# Patient Record
Sex: FEMALE | Race: WHITE | NOT HISPANIC OR LATINO | URBAN - METROPOLITAN AREA
[De-identification: names, ages, dates, MRNs, and addresses within clinical notes are randomized per-mention and may not be internally consistent; named-entity substitution may affect disease eponyms.]

---

## 2021-05-26 ENCOUNTER — OFFICE (OUTPATIENT)
Dept: URBAN - METROPOLITAN AREA CLINIC 67 | Facility: CLINIC | Age: 46
End: 2021-05-26

## 2021-05-26 VITALS — HEIGHT: 62 IN | TEMPERATURE: 97.3 F | OXYGEN SATURATION: 98 % | WEIGHT: 226 LBS | HEART RATE: 98 BPM

## 2021-05-26 DIAGNOSIS — K51.019 ULCERATIVE (CHRONIC) PANCOLITIS WITH UNSPECIFIED COMPLICATIO: ICD-10-CM

## 2021-05-26 DIAGNOSIS — R19.7 DIARRHEA, UNSPECIFIED: ICD-10-CM

## 2021-05-26 DIAGNOSIS — K62.5 HEMORRHAGE OF ANUS AND RECTUM: ICD-10-CM

## 2021-05-26 DIAGNOSIS — R10.9 UNSPECIFIED ABDOMINAL PAIN: ICD-10-CM

## 2021-05-26 PROCEDURE — 99214 OFFICE O/P EST MOD 30 MIN: CPT

## 2021-05-26 RX ORDER — MESALAMINE 375 MG/1
1.5 CAPSULE, EXTENDED RELEASE ORAL
Qty: 120 | Refills: 1 | Status: ACTIVE

## 2021-05-26 NOTE — SERVICEHPINOTES
Ayla Szymanski   is seen today for a follow-up visit.     She returns today for Apriso refill and stating she has been in a flare x 3 weeks. She complains of diarrhea, up to 10 times per day, with nocturnal stooling, bright red blood per rectum, and lower abdominal cramping pain. She states she was given a Medrol dose pack by her PCP for a sinus infection the first part of April and a script for Augmentin for 10 days that she finished on Monday. She states "this flare is different than the others."

## 2021-05-27 LAB
C-REACTIVE PROTEIN, QUANT: 13 MG/L — HIGH (ref 0–10)
CBC WITH DIFFERENTIAL/PLATELET: BASO (ABSOLUTE): 0.2 X10E3/UL (ref 0–0.2)
CBC WITH DIFFERENTIAL/PLATELET: BASOS: 1 %
CBC WITH DIFFERENTIAL/PLATELET: EOS (ABSOLUTE): 0.9 X10E3/UL — HIGH (ref 0–0.4)
CBC WITH DIFFERENTIAL/PLATELET: EOS: 5 %
CBC WITH DIFFERENTIAL/PLATELET: HEMATOCRIT: 39.1 % (ref 34–46.6)
CBC WITH DIFFERENTIAL/PLATELET: HEMATOLOGY COMMENTS: (no result)
CBC WITH DIFFERENTIAL/PLATELET: HEMOGLOBIN: 13 G/DL (ref 11.1–15.9)
CBC WITH DIFFERENTIAL/PLATELET: IMMATURE CELLS: (no result)
CBC WITH DIFFERENTIAL/PLATELET: IMMATURE GRANS (ABS): 0 X10E3/UL (ref 0–0.1)
CBC WITH DIFFERENTIAL/PLATELET: IMMATURE GRANULOCYTES: 0 %
CBC WITH DIFFERENTIAL/PLATELET: LYMPHS (ABSOLUTE): 2.9 X10E3/UL (ref 0.7–3.1)
CBC WITH DIFFERENTIAL/PLATELET: LYMPHS: 16 %
CBC WITH DIFFERENTIAL/PLATELET: MCH: 30 PG (ref 26.6–33)
CBC WITH DIFFERENTIAL/PLATELET: MCHC: 33.2 G/DL (ref 31.5–35.7)
CBC WITH DIFFERENTIAL/PLATELET: MCV: 90 FL (ref 79–97)
CBC WITH DIFFERENTIAL/PLATELET: MONOCYTES(ABSOLUTE): 1.1 X10E3/UL — HIGH (ref 0.1–0.9)
CBC WITH DIFFERENTIAL/PLATELET: MONOCYTES: 6 %
CBC WITH DIFFERENTIAL/PLATELET: NEUTROPHILS (ABSOLUTE): 13.2 X10E3/UL — HIGH (ref 1.4–7)
CBC WITH DIFFERENTIAL/PLATELET: NEUTROPHILS: 72 %
CBC WITH DIFFERENTIAL/PLATELET: NRBC: (no result)
CBC WITH DIFFERENTIAL/PLATELET: PLATELETS: 466 X10E3/UL — HIGH (ref 150–450)
CBC WITH DIFFERENTIAL/PLATELET: RBC: 4.34 X10E6/UL (ref 3.77–5.28)
CBC WITH DIFFERENTIAL/PLATELET: RDW: 13.2 % (ref 11.7–15.4)
CBC WITH DIFFERENTIAL/PLATELET: WBC: 18.2 X10E3/UL — HIGH (ref 3.4–10.8)
COMP. METABOLIC PANEL (14): A/G RATIO: 1.3 (ref 1.2–2.2)
COMP. METABOLIC PANEL (14): ALBUMIN: 3.9 G/DL (ref 3.8–4.8)
COMP. METABOLIC PANEL (14): ALKALINE PHOSPHATASE: 94 IU/L (ref 48–121)
COMP. METABOLIC PANEL (14): ALT (SGPT): 10 IU/L (ref 0–32)
COMP. METABOLIC PANEL (14): AST (SGOT): 11 IU/L (ref 0–40)
COMP. METABOLIC PANEL (14): BILIRUBIN, TOTAL: <0.2 MG/DL
COMP. METABOLIC PANEL (14): BUN/CREATININE RATIO: 9 (ref 9–23)
COMP. METABOLIC PANEL (14): BUN: 7 MG/DL (ref 6–24)
COMP. METABOLIC PANEL (14): CALCIUM: 8.7 MG/DL (ref 8.7–10.2)
COMP. METABOLIC PANEL (14): CARBON DIOXIDE, TOTAL: 18 MMOL/L — LOW (ref 20–29)
COMP. METABOLIC PANEL (14): CHLORIDE: 106 MMOL/L (ref 96–106)
COMP. METABOLIC PANEL (14): CREATININE: 0.74 MG/DL (ref 0.57–1)
COMP. METABOLIC PANEL (14): EGFR IF AFRICN AM: 113 ML/MIN/1.73 (ref 59–?)
COMP. METABOLIC PANEL (14): EGFR IF NONAFRICN AM: 98 ML/MIN/1.73 (ref 59–?)
COMP. METABOLIC PANEL (14): GLOBULIN, TOTAL: 3.1 G/DL (ref 1.5–4.5)
COMP. METABOLIC PANEL (14): GLUCOSE: 87 MG/DL (ref 65–99)
COMP. METABOLIC PANEL (14): POTASSIUM: 4.1 MMOL/L (ref 3.5–5.2)
COMP. METABOLIC PANEL (14): PROTEIN, TOTAL: 7 G/DL (ref 6–8.5)
COMP. METABOLIC PANEL (14): SODIUM: 139 MMOL/L (ref 134–144)

## 2021-05-29 LAB
GI PROFILE, STOOL, PCR: ADENOVIRUS F 40/41: NOT DETECTED
GI PROFILE, STOOL, PCR: ASTROVIRUS: NOT DETECTED
GI PROFILE, STOOL, PCR: C DIFFICILE TOXIN A/B: NOT DETECTED
GI PROFILE, STOOL, PCR: CAMPYLOBACTER: NOT DETECTED
GI PROFILE, STOOL, PCR: CRYPTOSPORIDIUM: NOT DETECTED
GI PROFILE, STOOL, PCR: CYCLOSPORA CAYETANENSIS: NOT DETECTED
GI PROFILE, STOOL, PCR: E COLI O157: (no result)
GI PROFILE, STOOL, PCR: ENTAMOEBA HISTOLYTICA: NOT DETECTED
GI PROFILE, STOOL, PCR: ENTEROAGGREGATIVE E COLI: NOT DETECTED
GI PROFILE, STOOL, PCR: ENTEROPATHOGENIC E COLI: NOT DETECTED
GI PROFILE, STOOL, PCR: ENTEROTOXIGENIC E COLI: NOT DETECTED
GI PROFILE, STOOL, PCR: GIARDIA LAMBLIA: NOT DETECTED
GI PROFILE, STOOL, PCR: NOROVIRUS GI/GII: NOT DETECTED
GI PROFILE, STOOL, PCR: PLESIOMONAS SHIGELLOIDES: NOT DETECTED
GI PROFILE, STOOL, PCR: ROTAVIRUS A: NOT DETECTED
GI PROFILE, STOOL, PCR: SALMONELLA: NOT DETECTED
GI PROFILE, STOOL, PCR: SAPOVIRUS: NOT DETECTED
GI PROFILE, STOOL, PCR: SHIGA-TOXIN-PRODUCING E COLI: NOT DETECTED
GI PROFILE, STOOL, PCR: SHIGELLA/ENTEROINVASIVE E COLI: NOT DETECTED
GI PROFILE, STOOL, PCR: VIBRIO CHOLERAE: NOT DETECTED
GI PROFILE, STOOL, PCR: VIBRIO: NOT DETECTED
GI PROFILE, STOOL, PCR: YERSINIA ENTEROCOLITICA: NOT DETECTED

## 2021-06-02 LAB — CALPROTECTIN, FECAL: 627 UG/G — HIGH (ref 0–120)

## 2022-02-04 ENCOUNTER — RX ONLY (RX ONLY)
Age: 47
End: 2022-02-04

## 2022-02-04 ENCOUNTER — OTHER (OUTPATIENT)
Dept: URBAN - METROPOLITAN AREA CLINIC 18 | Facility: CLINIC | Age: 47
Setting detail: DERMATOLOGY
End: 2022-02-04

## 2022-02-04 PROCEDURE — 99203 OFFICE O/P NEW LOW 30 MIN: CPT

## 2022-02-04 RX ORDER — TRIAMCINOLONE ACETONIDE 1 MG/G
A SMALL AMOUNT CREAM TOPICAL TWICE A DAY
Qty: 454 | Refills: 1
Start: 2022-02-04

## 2022-02-18 ENCOUNTER — RX ONLY (RX ONLY)
Age: 47
End: 2022-02-18

## 2022-02-18 ENCOUNTER — FOLLOW-UP (OUTPATIENT)
Dept: URBAN - METROPOLITAN AREA CLINIC 18 | Facility: CLINIC | Age: 47
Setting detail: DERMATOLOGY
End: 2022-02-18

## 2022-02-18 DIAGNOSIS — D48.5 NEOPLASM OF UNCERTAIN BEHAVIOR OF SKIN: ICD-10-CM

## 2022-02-18 PROCEDURE — 99214 OFFICE O/P EST MOD 30 MIN: CPT

## 2022-02-18 RX ORDER — PREDNISONE 20 MG/1
TABLET ORAL
Qty: 20 | Refills: 0
Start: 2022-02-18

## 2022-05-17 ENCOUNTER — OFFICE (OUTPATIENT)
Dept: URBAN - METROPOLITAN AREA CLINIC 84 | Facility: CLINIC | Age: 47
End: 2022-05-17

## 2022-05-17 VITALS
WEIGHT: 226 LBS | HEIGHT: 62 IN | SYSTOLIC BLOOD PRESSURE: 142 MMHG | OXYGEN SATURATION: 97 % | HEART RATE: 77 BPM | DIASTOLIC BLOOD PRESSURE: 76 MMHG

## 2022-05-17 PROCEDURE — 99214 OFFICE O/P EST MOD 30 MIN: CPT | Performed by: SPECIALIST

## 2022-05-17 RX ORDER — MESALAMINE 375 MG/1
1.5 CAPSULE, EXTENDED RELEASE ORAL
Qty: 120 | Refills: 1 | Status: ACTIVE

## 2022-05-17 NOTE — SERVICEHPINOTES
Ayla Szymanski   is seen today for a follow-up visit.     Pt with pan ulcerative colitis diagnosed years ago on Apriso and  Prednisone with rapid tapers about once every 2 years.  Colon 2020 minimal disease flare 2021 with Calprotectin over 600 and given steroids by Kacy Gerard.  Pt also with hx back pain and urticaria treated by Dr Rapp.  Recent rash and treated with steroids by [facility].  Amna COVID in 2021 all of her autoimmune issues have been worse.
br
br Currently 2 formed bm per daybr
br   Prior w/u:
br EGD: 7/28/08, 3/4/11br Colon: 3/4/11, 7/7/20

## 2022-05-17 NOTE — SERVICENOTES
Lengthy discussion with pt about immunosuppressants (6MP, six mercaptopurine, Imuran, methotrexate), biologics (Humira, Remicade, Simponi, Cimzia, Entyvio, and Stelara), and Xeljanz. Risks including infection, abnormal liver functions test, liver toxicity (including activation of hepatitis B), pancreatitis, bone marrow suppression (anemia, decreased WBC, decreased platelets), worsening of heart failure, TB (tuberculosis) activation, and rare lymphomas which can be fatal were all discussed, handouts were given, and need for close monitoring of labs was stressed. Potential risks (osteoporosis, avascular necrosis, weight gain, elevation of glucose among many) of steroids (prednisone and budesonide). First pass hepatic metabolism of Entocort was also discussed.
Handouts were either given to the patient today or in the past. The patient was encouraged to ask questions as well as encouraged to look at the [facility] website or call if further information is needed. It was also recommended that the patient consult with their pharmacist and PCP if any of the above medications are used. The patient offered no further questions.

Pt encouraged to discuss hep B and TB status with prescribing MD. Also counseled about need for skin exams and watch for signs of heart failure. Also encouraged to remain up to date with vaccines via PCP but avoid live vaccinations

## 2023-06-15 ENCOUNTER — OFFICE (OUTPATIENT)
Dept: URBAN - METROPOLITAN AREA CLINIC 67 | Facility: CLINIC | Age: 48
End: 2023-06-15

## 2023-06-15 VITALS
WEIGHT: 243 LBS | SYSTOLIC BLOOD PRESSURE: 125 MMHG | OXYGEN SATURATION: 98 % | HEART RATE: 95 BPM | DIASTOLIC BLOOD PRESSURE: 78 MMHG | HEIGHT: 62 IN

## 2023-06-15 DIAGNOSIS — K51.00 ULCERATIVE (CHRONIC) PANCOLITIS WITHOUT COMPLICATIONS: ICD-10-CM

## 2023-06-15 DIAGNOSIS — Z83.71 FAMILY HISTORY OF COLONIC POLYPS: ICD-10-CM

## 2023-06-15 DIAGNOSIS — Z80.0 FAMILY HISTORY OF MALIGNANT NEOPLASM OF DIGESTIVE ORGANS: ICD-10-CM

## 2023-06-15 PROCEDURE — 99214 OFFICE O/P EST MOD 30 MIN: CPT | Performed by: SPECIALIST

## 2023-06-15 RX ORDER — MESALAMINE 0.38 G/1
CAPSULE, EXTENDED RELEASE ORAL
Qty: 360 | Refills: 3 | Status: ACTIVE
Start: 2023-06-15

## 2023-06-15 NOTE — SERVICEHPINOTES
Ayla Szymanski   is seen today for a follow-up visit. Pt doing well.  Has 1-2 formed non bloody BM.  Labd persistant elevation WBC and platelets and to see hematology.  CMP normal renal and liver testsbr
br
br
br     Pt with pan ulcerative colitis diagnosed years ago on Apriso and  Prednisone with rapid tapers about once every 2 years.  Colon 2020 minimal disease flare 2021 with Calprotectin over 600 and given steroids by Kacy Gerard.  Pt also with hx back pain and urticaria treated by Dr Rapp.  Recent rash and treated with steroids by [facility].  Amna COVID in 2021 all of her autoimmune issues have been worse.Currently 2 formed bm per dayPrior w/u:brEGD: 7/28/08, 3/4/11brColon: 3/4/11 Dr Sparks, 7/7/20

## 2023-06-15 NOTE — SERVICENOTES
Lengthy discussion with pt about immunosuppressants (6MP, six mercaptopurine, Imuran, methotrexate), biologics (Humira, Remicade, Simponi, Cimzia, Entyvio, and Stelara), and Xeljanz. Risks including infection, abnormal liver functions test, liver toxicity (including activation of hepatitis B), pancreatitis, bone marrow suppression (anemia, decreased WBC, decreased platelets), worsening of heart failure, TB (tuberculosis) activation, and rare lymphomas which can be fatal were all discussed, handouts were given, and need for close monitoring of labs was stressed. Potential risks (osteoporosis, avascular necrosis, weight gain, elevation of glucose among many) of steroids (prednisone and budesonide). First pass hepatic metabolism of Entocort was also discussed.
Handouts were either given to the patient today or in the past. The patient was encouraged to ask questions as well as encouraged to look at the [facility] website or call if further information is needed. It was also recommended that the patient consult with their pharmacist and PCP if any of the above medications are used. The patient offered no further questions.

Pt encouraged to discuss hep B and TB status with prescribing MD. Also counseled about need for skin exams and watch for signs of heart failure. Also encouraged to remain up to date with vaccines via PCP but avoid live vaccinations

## 2024-05-23 ENCOUNTER — OFFICE (OUTPATIENT)
Dept: URBAN - METROPOLITAN AREA CLINIC 67 | Facility: CLINIC | Age: 49
End: 2024-05-23

## 2024-05-23 VITALS
HEART RATE: 111 BPM | HEIGHT: 62 IN | OXYGEN SATURATION: 97 % | DIASTOLIC BLOOD PRESSURE: 82 MMHG | SYSTOLIC BLOOD PRESSURE: 122 MMHG | WEIGHT: 228 LBS

## 2024-05-23 DIAGNOSIS — K51.019 ULCERATIVE (CHRONIC) PANCOLITIS WITH UNSPECIFIED COMPLICATIO: ICD-10-CM

## 2024-05-23 PROCEDURE — 99214 OFFICE O/P EST MOD 30 MIN: CPT | Performed by: SPECIALIST

## 2024-05-23 RX ORDER — POLYETHYLENE GLYCOL 3350, SODIUM SULFATE, SODIUM CHLORIDE, POTASSIUM CHLORIDE, ASCORBIC ACID, SODIUM ASCORBATE 140-9-5.2G
KIT ORAL
Qty: 1 | Refills: 0 | Status: ACTIVE
Start: 2024-05-23

## 2024-05-23 NOTE — SERVICEHPINOTES
Ayla Szymanski   is seen today for a follow-up visit.     Pt has been watching carbs and has been able to lose some weight. Stress after knee surgery and death of step mother.  Flare with 3-5 urgent BM mixed with blood and nocturnal BM.  Denies abd pain.  remote dx pan UC on mesalamine.  No recent steroids or antibiotics.  Last colon 2020 was normal.  anemia after stopping hormal therapy and heavy menses...now back on meds and followed [place] oncbr
br Prior visit:
br Pt doing well.  Has 1-2 formed non bloody BM.  Labs persistent elevation WBC and platelets and to see hematology.  CMP normal renal and liver testsPt with pan ulcerative colitis diagnosed years ago on Apriso and  Prednisone with rapid tapers about once every 2 years.  Colon 2020 minimal disease flare 2021 with Calprotectin over 600 and given steroids by Kacy Gerard.  Pt also with hx back pain and urticaria treated by Dr Rapp.  Recent rash and treated with steroids by [facility] [facility].  Amna COVID in 2021 all of her autoimmune issues have been worse.Currently 2 formed bm per dayPrior w/u:brEGD: 7/28/08, 3/4/11brColon: 3/4/11 Dr Sparks, 7/7/20

## 2024-07-11 ENCOUNTER — AMBULATORY SURGICAL CENTER (OUTPATIENT)
Dept: URBAN - METROPOLITAN AREA SURGERY 19 | Facility: SURGERY | Age: 49
End: 2024-07-11

## 2024-07-11 ENCOUNTER — OFFICE (OUTPATIENT)
Dept: URBAN - METROPOLITAN AREA PATHOLOGY 10 | Facility: PATHOLOGY | Age: 49
End: 2024-07-11
Payer: COMMERCIAL

## 2024-07-11 DIAGNOSIS — R10.11 RIGHT UPPER QUADRANT PAIN: ICD-10-CM

## 2024-07-11 DIAGNOSIS — K51.00 ULCERATIVE (CHRONIC) PANCOLITIS WITHOUT COMPLICATIONS: ICD-10-CM

## 2024-07-11 DIAGNOSIS — K29.50 UNSPECIFIED CHRONIC GASTRITIS WITHOUT BLEEDING: ICD-10-CM

## 2024-07-11 DIAGNOSIS — B25.8 OTHER CYTOMEGALOVIRAL DISEASES: ICD-10-CM

## 2024-07-11 LAB
COLONOSCOPY STUDY: (no result)
COLONOSCOPY STUDY: (no result)

## 2024-07-11 PROCEDURE — 45380 COLONOSCOPY AND BIOPSY: CPT | Performed by: SPECIALIST

## 2024-07-11 PROCEDURE — 88313 SPECIAL STAINS GROUP 2: CPT | Performed by: PATHOLOGY

## 2024-07-11 PROCEDURE — 88342 IMHCHEM/IMCYTCHM 1ST ANTB: CPT | Performed by: PATHOLOGY

## 2024-07-11 PROCEDURE — 88305 TISSUE EXAM BY PATHOLOGIST: CPT | Performed by: PATHOLOGY

## 2024-07-11 PROCEDURE — 43239 EGD BIOPSY SINGLE/MULTIPLE: CPT | Mod: 51 | Performed by: SPECIALIST

## 2025-01-07 ENCOUNTER — OFFICE (OUTPATIENT)
Dept: URBAN - METROPOLITAN AREA CLINIC 67 | Facility: CLINIC | Age: 50
End: 2025-01-07
Payer: COMMERCIAL

## 2025-01-07 VITALS
HEIGHT: 62 IN | HEART RATE: 72 BPM | HEART RATE: 62 BPM | SYSTOLIC BLOOD PRESSURE: 118 MMHG | OXYGEN SATURATION: 97 % | SYSTOLIC BLOOD PRESSURE: 112 MMHG | SYSTOLIC BLOOD PRESSURE: 143 MMHG | TEMPERATURE: 98.6 F | DIASTOLIC BLOOD PRESSURE: 78 MMHG | TEMPERATURE: 98.4 F | HEART RATE: 74 BPM | OXYGEN SATURATION: 98 % | SYSTOLIC BLOOD PRESSURE: 116 MMHG | RESPIRATION RATE: 14 BRPM | HEART RATE: 68 BPM | DIASTOLIC BLOOD PRESSURE: 75 MMHG | DIASTOLIC BLOOD PRESSURE: 84 MMHG | WEIGHT: 230 LBS

## 2025-01-07 DIAGNOSIS — K51.019 ULCERATIVE (CHRONIC) PANCOLITIS WITH UNSPECIFIED COMPLICATIO: ICD-10-CM

## 2025-01-07 PROCEDURE — 96413 CHEMO IV INFUSION 1 HR: CPT | Performed by: SPECIALIST

## 2025-01-07 PROCEDURE — 96415 CHEMO IV INFUSION ADDL HR: CPT | Performed by: SPECIALIST

## 2025-01-07 RX ADMIN — RISANKIZUMAB-RZAA 1200 MG: 60 INJECTION INTRAVENOUS at 11:30

## 2025-01-07 NOTE — SERVICEHPINOTES
See follow-up visit from:   12/3/2024   
br   Date &amp Results of last TB test:   12/3/2024     Negative   
br   Labs and/or Additional Orders: n/abr Insurance authorization:  rajat# [id number] dos 12/09/24-03/09/25 br Pharmacy:   [facility]   
br   Rate of Infusion:  Standard  brInfusion order placed on:  12/3/2024   
br 
Time out performed with:  Manohar Díaz
ICF Date:  1/7/2025

## 2025-02-04 ENCOUNTER — OFFICE (OUTPATIENT)
Dept: URBAN - METROPOLITAN AREA CLINIC 67 | Facility: CLINIC | Age: 50
End: 2025-02-04
Payer: COMMERCIAL

## 2025-02-04 VITALS
RESPIRATION RATE: 14 BRPM | WEIGHT: 232 LBS | TEMPERATURE: 98.1 F | DIASTOLIC BLOOD PRESSURE: 81 MMHG | HEART RATE: 66 BPM | SYSTOLIC BLOOD PRESSURE: 133 MMHG | SYSTOLIC BLOOD PRESSURE: 115 MMHG | SYSTOLIC BLOOD PRESSURE: 122 MMHG | TEMPERATURE: 98.2 F | OXYGEN SATURATION: 97 % | DIASTOLIC BLOOD PRESSURE: 69 MMHG | OXYGEN SATURATION: 95 % | HEART RATE: 68 BPM | DIASTOLIC BLOOD PRESSURE: 84 MMHG | DIASTOLIC BLOOD PRESSURE: 65 MMHG | HEART RATE: 64 BPM | SYSTOLIC BLOOD PRESSURE: 118 MMHG | HEART RATE: 52 BPM | HEIGHT: 62 IN

## 2025-02-04 DIAGNOSIS — K51.019 ULCERATIVE (CHRONIC) PANCOLITIS WITH UNSPECIFIED COMPLICATIO: ICD-10-CM

## 2025-02-04 PROCEDURE — 96415 CHEMO IV INFUSION ADDL HR: CPT | Performed by: SPECIALIST

## 2025-02-04 PROCEDURE — 96413 CHEMO IV INFUSION 1 HR: CPT | Performed by: SPECIALIST

## 2025-02-04 RX ADMIN — RISANKIZUMAB-RZAA 1200 MG: 60 INJECTION INTRAVENOUS at 10:18

## 2025-02-04 NOTE — SERVICEHPINOTES
See follow-up visit from:   12/3/2024   
br   Date &amp Results of last TB test:   12/3/2024     Negative   
br   Labs and/or Additional Orders: n/abr Insurance authorization:  rajat# [id number] dos 12/09/24-03/09/25br Pharmacy:   [facility]   
br   Rate of Infusion:  Standard  brInfusion order placed on:  12/3/2024   
br 
Time out performed with:   Manohar Medina
ICF Date:  1/7/2025

## 2025-03-04 ENCOUNTER — OFFICE (OUTPATIENT)
Dept: URBAN - METROPOLITAN AREA CLINIC 67 | Facility: CLINIC | Age: 50
End: 2025-03-04
Payer: COMMERCIAL

## 2025-03-04 VITALS
TEMPERATURE: 98.5 F | SYSTOLIC BLOOD PRESSURE: 110 MMHG | DIASTOLIC BLOOD PRESSURE: 85 MMHG | SYSTOLIC BLOOD PRESSURE: 127 MMHG | WEIGHT: 235 LBS | OXYGEN SATURATION: 99 % | SYSTOLIC BLOOD PRESSURE: 106 MMHG | HEART RATE: 71 BPM | HEART RATE: 72 BPM | DIASTOLIC BLOOD PRESSURE: 60 MMHG | HEIGHT: 62 IN | HEART RATE: 60 BPM | DIASTOLIC BLOOD PRESSURE: 68 MMHG | TEMPERATURE: 98.1 F | HEART RATE: 62 BPM | DIASTOLIC BLOOD PRESSURE: 73 MMHG | DIASTOLIC BLOOD PRESSURE: 78 MMHG | SYSTOLIC BLOOD PRESSURE: 118 MMHG | RESPIRATION RATE: 14 BRPM | OXYGEN SATURATION: 97 %

## 2025-03-04 DIAGNOSIS — K51.90 ULCERATIVE COLITIS, UNSPECIFIED, WITHOUT COMPLICATIONS: ICD-10-CM

## 2025-03-04 PROCEDURE — 96413 CHEMO IV INFUSION 1 HR: CPT | Performed by: SPECIALIST

## 2025-03-04 PROCEDURE — 96415 CHEMO IV INFUSION ADDL HR: CPT | Performed by: SPECIALIST

## 2025-03-04 RX ADMIN — RISANKIZUMAB-RZAA 1200 MG: 60 INJECTION INTRAVENOUS at 10:15

## 2025-03-04 NOTE — SERVICEHPINOTES
See follow-up visit from:   12/3/2024   
br   Date &amp Results of last TB test:   12/3/2024     Negative   
br   Labs and/or Additional Orders: CMP
br Insurance authorization:  rajat# [id number] dos 12/09/24-03/09/25br Pharmacy:   [facility]   
br   Rate of Infusion:  Standard  brInfusion order placed on:  12/3/2024   
br 
Time out performed with:   Allen Arndt
ICF Date:  1/7/2025